# Patient Record
Sex: MALE | Race: WHITE | NOT HISPANIC OR LATINO
[De-identification: names, ages, dates, MRNs, and addresses within clinical notes are randomized per-mention and may not be internally consistent; named-entity substitution may affect disease eponyms.]

---

## 2017-07-04 ENCOUNTER — TRANSCRIPTION ENCOUNTER (OUTPATIENT)
Age: 42
End: 2017-07-04

## 2022-01-11 ENCOUNTER — TRANSCRIPTION ENCOUNTER (OUTPATIENT)
Age: 47
End: 2022-01-11

## 2022-04-15 ENCOUNTER — TRANSCRIPTION ENCOUNTER (OUTPATIENT)
Age: 47
End: 2022-04-15

## 2023-11-25 ENCOUNTER — NON-APPOINTMENT (OUTPATIENT)
Age: 48
End: 2023-11-25

## 2024-01-29 ENCOUNTER — APPOINTMENT (OUTPATIENT)
Dept: ORTHOPEDIC SURGERY | Facility: CLINIC | Age: 49
End: 2024-01-29
Payer: COMMERCIAL

## 2024-01-29 VITALS — BODY MASS INDEX: 44.1 KG/M2 | HEIGHT: 71 IN | WEIGHT: 315 LBS

## 2024-01-29 DIAGNOSIS — Z87.2 PERSONAL HISTORY OF DISEASES OF THE SKIN AND SUBCUTANEOUS TISSUE: ICD-10-CM

## 2024-01-29 DIAGNOSIS — Z00.00 ENCOUNTER FOR GENERAL ADULT MEDICAL EXAMINATION W/OUT ABNORMAL FINDINGS: ICD-10-CM

## 2024-01-29 PROCEDURE — 99203 OFFICE O/P NEW LOW 30 MIN: CPT | Mod: 25

## 2024-01-29 PROCEDURE — 20610 DRAIN/INJ JOINT/BURSA W/O US: CPT | Mod: RT

## 2024-01-29 PROCEDURE — J3490M: CUSTOM

## 2024-01-29 PROCEDURE — 73562 X-RAY EXAM OF KNEE 3: CPT | Mod: RT

## 2024-01-29 RX ORDER — GUSELKUMAB 100 MG/ML
100 INJECTION SUBCUTANEOUS
Refills: 0 | Status: ACTIVE | COMMUNITY

## 2024-01-29 NOTE — PHYSICAL EXAM
[NL (0)] : extension 0 degrees [5___] : hamstring 5[unfilled]/5 [] : patient ambulates without assistive device [Right] : right knee [Degenerative change] : Degenerative change

## 2024-02-15 ENCOUNTER — APPOINTMENT (OUTPATIENT)
Dept: ORTHOPEDIC SURGERY | Facility: CLINIC | Age: 49
End: 2024-02-15
Payer: COMMERCIAL

## 2024-02-15 VITALS — HEIGHT: 71 IN | WEIGHT: 315 LBS | BODY MASS INDEX: 44.1 KG/M2

## 2024-02-15 DIAGNOSIS — L40.50 ARTHROPATHIC PSORIASIS, UNSPECIFIED: ICD-10-CM

## 2024-02-15 PROCEDURE — ZZZZZ: CPT

## 2024-02-15 NOTE — HISTORY OF PRESENT ILLNESS
[7] : 7 [2] : 2 [FreeTextEntry1] : rt knee [de-identified] : 2/15/24: Patient is here for right knee pain that began in 12/2023, not due to injury. Swelling. Denies clicking/locking. Occasional buckling. Worsens with prolonged walking/standing. No prior injury. Went to [facility] urgent care on 1/29/24, got XR - negative. Received CSI with 3 day relief. Tried Aleve. Had left knee partial medial meniscectomy with Ghazal in 2021.

## 2024-02-15 NOTE — ASSESSMENT
[FreeTextEntry1] : will give him mobic and get mri as he did well with left knee arthroscopy in the past discussed if its more psoriatic oa related arthroscopic intervention would be unpredictable

## 2024-02-15 NOTE — HISTORY OF PRESENT ILLNESS
[7] : 7 [2] : 2 [FreeTextEntry1] : rt knee [de-identified] : 2/15/24: Patient is here for right knee pain that began in 12/2023, not due to injury. Swelling. Denies clicking/locking. Occasional buckling. Worsens with prolonged walking/standing. No prior injury. Went to [facility] urgent care on 1/29/24, got XR - negative. Received CSI with 3 day relief. Tried Aleve. Had left knee partial medial meniscectomy with Ghazal in 2021.

## 2024-02-15 NOTE — PHYSICAL EXAM
[Negative] : negative Too's [Degenerative change] : Degenerative change [Right] : right knee [NL (0)] : extension 0 degrees [5___] : hamstring 5[unfilled]/5 [Positive] : positive Hanane [] : patient ambulates without assistive device [TWNoteComboBox7] : flexion 110 degrees

## 2024-02-16 ENCOUNTER — APPOINTMENT (OUTPATIENT)
Dept: MRI IMAGING | Facility: CLINIC | Age: 49
End: 2024-02-16
Payer: COMMERCIAL

## 2024-02-16 PROCEDURE — 73721 MRI JNT OF LWR EXTRE W/O DYE: CPT | Mod: RT

## 2024-03-07 ENCOUNTER — APPOINTMENT (OUTPATIENT)
Dept: ORTHOPEDIC SURGERY | Facility: CLINIC | Age: 49
End: 2024-03-07
Payer: COMMERCIAL

## 2024-03-07 VITALS — WEIGHT: 315 LBS | HEIGHT: 71 IN | BODY MASS INDEX: 44.1 KG/M2

## 2024-03-07 DIAGNOSIS — S83.231D COMPLEX TEAR OF MEDIAL MENISCUS, CURRENT INJURY, RIGHT KNEE, SUBSEQUENT ENCOUNTER: ICD-10-CM

## 2024-03-07 DIAGNOSIS — E66.01 MORBID (SEVERE) OBESITY DUE TO EXCESS CALORIES: ICD-10-CM

## 2024-03-07 PROCEDURE — ZZZZZ: CPT

## 2024-03-07 NOTE — ASSESSMENT
[FreeTextEntry1] : Reviewed MRI in detail.  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  We had a long discussion about the MRI findings and the correlation with his obesity.  BMI is 50 and he does not have a PCP at this time.  We had a long discussion about wholesome changes including yearly checkups with a PCP and considering medication possibly gastric bypass surgery.  Unless the weight comes off I think this is going to end up with an accelerated arthritis situation eventually potentially even needing a knee replacement.  Corticosteroid injection administered for relief today.  He will contact me in 2 weeks if still symptomatic could consider arthroscopic debridement but explained would not help with the root injury.  The risks and benefits of surgery have been discussed. Risks include but are not limited to bleeding, infection, reaction to anesthesia, injury to blood vessels and nerves, malunion, nonunion, DVT, PE, necessity of repeat surgery, chronic pain, loss of limb and death. The patient understands the risks and agrees with the surgical plan. All questions have been answered.

## 2024-03-07 NOTE — PROCEDURE
[FreeTextEntry3] : - Large joint injection was performed of the right knee.  - The indication for this procedure was pain and inflammation. Patient has tried OTC's including aspirin, Ibuprofen, Aleve, etc or prescription NSAIDS, and/or exercises at home and/or physical therapy without satisfactory response, patient had decreased mobility in the joint and the risks benefits, and alternatives have been discussed, and verbal consent was obtained. The site was prepped with alcohol, betadine, ethyl chloride sprayed topically and sterile technique used.  - An injection of Betamethasone 2cc; Marcaine 5cc injected. - Patient was advised to call if redness, pain or fever occur, apply ice for 15 minutes out of every hour for the next 12-24 hours as tolerated and patient was advised to rest the joint(s) for 2 days.  - Ultrasound guidance was indicated for this patient due to prior failure or difficult injection. All ultrasound images have been permanently captured and stored accordingly in our picture archiving and communication system. Visualization of the needle and placement of injection was performed without complication.

## 2024-03-07 NOTE — HISTORY OF PRESENT ILLNESS
[de-identified] : 2/15/24: Patient is here for right knee pain that began in 12/2023, not due to injury. Swelling. Denies clicking/locking. Occasional buckling. Worsens with prolonged walking/standing. No prior injury. Went to [facility] urgent care on 1/29/24, got XR - negative. Received CSI with 3 day relief. Tried Aleve. Had left knee partial medial meniscectomy with Milchteim in 2021.   3/7/24: here to follow up on right knee MRI. Notes no improvement. Tried Meloxicam.

## 2024-03-10 ENCOUNTER — NON-APPOINTMENT (OUTPATIENT)
Age: 49
End: 2024-03-10

## 2024-04-25 ENCOUNTER — RX RENEWAL (OUTPATIENT)
Age: 49
End: 2024-04-25

## 2024-04-25 RX ORDER — MELOXICAM 15 MG/1
15 TABLET ORAL
Qty: 30 | Refills: 1 | Status: ACTIVE | COMMUNITY
Start: 2024-02-15 | End: 1900-01-01

## 2024-08-26 ENCOUNTER — NON-APPOINTMENT (OUTPATIENT)
Age: 49
End: 2024-08-26

## 2025-01-27 ENCOUNTER — NON-APPOINTMENT (OUTPATIENT)
Age: 50
End: 2025-01-27